# Patient Record
Sex: MALE | Race: ASIAN | URBAN - METROPOLITAN AREA
[De-identification: names, ages, dates, MRNs, and addresses within clinical notes are randomized per-mention and may not be internally consistent; named-entity substitution may affect disease eponyms.]

---

## 2020-06-01 ENCOUNTER — OFFICE VISIT (OUTPATIENT)
Dept: URBAN - METROPOLITAN AREA SURGERY CENTER 8 | Facility: SURGERY CENTER | Age: 40
End: 2020-06-01

## 2020-06-16 ENCOUNTER — OFFICE VISIT (OUTPATIENT)
Dept: URBAN - METROPOLITAN AREA CLINIC 31 | Facility: CLINIC | Age: 40
End: 2020-06-16

## 2020-07-28 ENCOUNTER — OFFICE VISIT (OUTPATIENT)
Dept: URBAN - METROPOLITAN AREA CLINIC 35 | Facility: CLINIC | Age: 40
End: 2020-07-28

## 2020-08-03 ENCOUNTER — OFFICE VISIT (OUTPATIENT)
Dept: URBAN - METROPOLITAN AREA SURGERY CENTER 8 | Facility: SURGERY CENTER | Age: 40
End: 2020-08-03

## 2020-08-19 ENCOUNTER — OFFICE VISIT (OUTPATIENT)
Dept: URBAN - METROPOLITAN AREA CLINIC 35 | Facility: CLINIC | Age: 40
End: 2020-08-19

## 2020-10-13 ENCOUNTER — OFFICE VISIT (OUTPATIENT)
Dept: URBAN - METROPOLITAN AREA CLINIC 31 | Facility: CLINIC | Age: 40
End: 2020-10-13

## 2020-10-13 ENCOUNTER — LAB OUTSIDE AN ENCOUNTER (OUTPATIENT)
Dept: URBAN - METROPOLITAN AREA SURGERY CENTER 8 | Facility: SURGERY CENTER | Age: 40
End: 2020-10-13

## 2020-10-13 VITALS — BODY MASS INDEX: 23.49 KG/M2 | WEIGHT: 155 LBS | HEIGHT: 68 IN

## 2020-10-13 RX ORDER — CHOLECALCIFEROL (VITAMIN D3) 50 MCG
1 TABLET TABLET ORAL ONCE A DAY
Qty: 30 | Status: ACTIVE | COMMUNITY

## 2020-10-13 RX ORDER — SODIUM PICOSULFATE, MAGNESIUM OXIDE, AND ANHYDROUS CITRIC ACID 10; 3.5; 12 MG/160ML; G/160ML; G/160ML
AS DIRECTED FOR SPLIT DOSE LIQUID ORAL
Qty: 1 BOX | Refills: 0 | Status: DISCONTINUED | COMMUNITY
Start: 2020-03-03

## 2020-10-13 RX ORDER — SODIUM PICOSULFATE, MAGNESIUM OXIDE, AND ANHYDROUS CITRIC ACID 10; 3.5; 12 MG/160ML; G/160ML; G/160ML
AS DIRECTED FOR SPLIT DOSE LIQUID ORAL
Qty: 1 BOX | Refills: 0 | OUTPATIENT
Start: 2020-10-13

## 2020-10-13 NOTE — HPI-MIGRATED HPI
;     Abdominal Pain : Patient presents today to discuss getting a colonoscopy. Patient states that the abdominal pain that he was having has greatly improved, "90 % improvement". Patient states that he has been doing yoga and some "home remedies" (homeopathy). Patient has made diet changes and this has also helped. He is currently having 2 bowel movements per day with normal and formed stools. Patient denies blood or mucous in his stool. He denies melena. Patient states that the main reason for the office visit today, is to discuss if it is okay to hold off doing the colonoscopy, as his symptoms have almost resolved. He has no other concerns or complaints today.  He reports history of worms in stool.;

## 2020-11-04 LAB
ALBUMIN/GLOBULIN RATIO: 1.6
ALBUMIN: 4.6
ALKALINE PHOSPHATASE: 102
ALKALINE PHOSPHATASE: 102
ALT: 67
AST: 38
BILIRUBIN, DIRECT: 0.1
BILIRUBIN, INDIRECT: 0.5
BILIRUBIN, TOTAL: 0.6
BONE ISOENZYMES: 75
GLOBULIN: 2.9
INTESTINAL ISOENZYMES: 0
LIVER ISOENZYMES: 25
PROTEIN, TOTAL: 7.5

## 2020-12-02 ENCOUNTER — OFFICE VISIT (OUTPATIENT)
Dept: URBAN - METROPOLITAN AREA SURGERY CENTER 8 | Facility: SURGERY CENTER | Age: 40
End: 2020-12-02

## 2020-12-03 ENCOUNTER — TELEPHONE ENCOUNTER (OUTPATIENT)
Dept: URBAN - METROPOLITAN AREA CLINIC 35 | Facility: CLINIC | Age: 40
End: 2020-12-03

## 2020-12-15 ENCOUNTER — OFFICE VISIT (OUTPATIENT)
Dept: URBAN - METROPOLITAN AREA CLINIC 31 | Facility: CLINIC | Age: 40
End: 2020-12-15

## 2020-12-15 VITALS — BODY MASS INDEX: 23.49 KG/M2 | WEIGHT: 155 LBS | HEIGHT: 68 IN

## 2020-12-15 RX ORDER — CHOLECALCIFEROL (VITAMIN D3) 50 MCG
1 TABLET TABLET ORAL ONCE A DAY
Qty: 30 | Status: ACTIVE | COMMUNITY

## 2020-12-15 RX ORDER — SODIUM PICOSULFATE, MAGNESIUM OXIDE, AND ANHYDROUS CITRIC ACID 10; 3.5; 12 MG/160ML; G/160ML; G/160ML
AS DIRECTED FOR SPLIT DOSE LIQUID ORAL
Qty: 1 BOX | Refills: 0 | Status: DISCONTINUED | COMMUNITY
Start: 2020-10-13

## 2020-12-15 NOTE — HPI-MIGRATED HPI
;     Colonoscopy Follow-Up : Patient presents today for a follow-up after the colonoscopy that was done on 12/02/2020. Since the procedure patient states that he is having 1-2 bowel movements per day with normal and formed stools. Patient denies seeing any blood or mucous in his stool. Patient denies melena. Patient states that he had no complications following his colonoscopy. LLQ discomfort has improved.  Worse with eating heavy.  Of note, patient has added 1 tablespoonful of Benefiber, to his daily medication regimen. Of note, patient completed labs on 11/04/2020 and the results are in EMR.;

## 2021-03-16 ENCOUNTER — TELEPHONE ENCOUNTER (OUTPATIENT)
Dept: URBAN - METROPOLITAN AREA CLINIC 35 | Facility: CLINIC | Age: 41
End: 2021-03-16

## 2021-03-19 ENCOUNTER — OFFICE VISIT (OUTPATIENT)
Dept: URBAN - METROPOLITAN AREA CLINIC 31 | Facility: CLINIC | Age: 41
End: 2021-03-19

## 2021-04-16 ENCOUNTER — OFFICE VISIT (OUTPATIENT)
Dept: URBAN - METROPOLITAN AREA CLINIC 31 | Facility: CLINIC | Age: 41
End: 2021-04-16

## 2021-05-05 ENCOUNTER — OFFICE VISIT (OUTPATIENT)
Dept: URBAN - METROPOLITAN AREA CLINIC 31 | Facility: CLINIC | Age: 41
End: 2021-05-05

## 2021-05-05 LAB
ALBUMIN/GLOBULIN RATIO: 1.5
ALBUMIN: 4.3
ALKALINE PHOSPHATASE: 107
ALKALINE PHOSPHATASE: 107
ALT: 27
AST: 21
BILIRUBIN, DIRECT: 0.1
BILIRUBIN, INDIRECT: 0.4
BILIRUBIN, TOTAL: 0.5
BONE ISOENZYMES: 71
GLOBULIN: 2.9
HEPATITIS A AB, TOTAL: REACTIVE
HEPATITIS A IGM: (no result)
HEPATITIS B CORE AB TOTAL: (no result)
HEPATITIS B SURFACE: (no result)
HEPATITIS B SURFACE: <5
HEPATITIS C ANTIBODY: (no result)
INTESTINAL ISOENZYMES: 0
LIVER ISOENZYMES: 29
PROTEIN, TOTAL: 7.2
SIGNAL TO CUT-OFF: 0.01

## 2021-05-05 RX ORDER — CHOLECALCIFEROL (VITAMIN D3) 50 MCG
1 TABLET TABLET ORAL ONCE A DAY
Qty: 30 | Status: ACTIVE | COMMUNITY

## 2021-05-05 NOTE — HPI-MIGRATED HPI
;     Abdominal Pain : Patient presents today to discuss getting a colonoscopy. Patient states that the abdominal pain that he was having has greatly improved, "90 % improvement". Patient states that he has been doing yoga and some "home remedies" (homeopathy). Patient has made diet changes and this has also helped. He is currently having 2 bowel movements per day with normal and formed stools. Patient denies blood or mucous in his stool. He denies melena. Patient states that the main reason for the office visit today, is to discuss if it is okay to hold off doing the colonoscopy, as his symptoms have almost resolved. He has no other concerns or complaints today.  He reports history of worms in stool.  Left lower quadrant abdominal pain        LAB: HEPATIC FUNCTION PANEL      LAB: HEPATITIS B SURFACE ANTIBODY (QUANT)      LAB: HEPATITIS B CORE AB TOTAL W/REFL IGM      LAB: HEPATITIS B SURFACE ANTIGEN W/REFL CONF      LAB: HEPATITIS C AB W/REFL TO HCV RNA, QN, PCR      LAB: HEPATITIS A AB, TOTAL W/REFL IGM Notes: Plan/Recommendations: 1) Continue Benefiber 1 tablespoonful daily. 2) Push water intake. 3) Review outside imaging results as they are received. 4) Follow liver chemistries. Further liver workup if elevations persist. 5) Repeat Colonoscopy merited in 6 to 10 years for CRC screening. 6) Results of colonoscopy d/w patient including his polyp pathology and diverticular disease. 7) Results of labs were d/w patient. 8) Repeat liver chemistries in 3 months along with hepatitis serologies. 9) Limit alcohol intake.   ;

## 2021-05-12 ENCOUNTER — TELEPHONE ENCOUNTER (OUTPATIENT)
Dept: URBAN - METROPOLITAN AREA CLINIC 35 | Facility: CLINIC | Age: 41
End: 2021-05-12

## 2021-05-12 ENCOUNTER — OFFICE VISIT (OUTPATIENT)
Dept: URBAN - METROPOLITAN AREA CLINIC 31 | Facility: CLINIC | Age: 41
End: 2021-05-12

## 2021-05-12 RX ORDER — CHOLECALCIFEROL (VITAMIN D3) 50 MCG
1 TABLET TABLET ORAL ONCE A DAY
Qty: 30 | Status: ACTIVE | COMMUNITY

## 2021-05-12 NOTE — HPI-MIGRATED HPI
;     Change in Bowel habits : Patient presents today for a follow-up office visit from 12/15/2020. he completed the labs on 04/30/2021 and the results are in EMR.    LAB: HEPATIC FUNCTION PANEL      LAB: HEPATITIS B SURFACE ANTIBODY (QUANT)      LAB: HEPATITIS B CORE AB TOTAL W/REFL IGM      LAB: HEPATITIS B SURFACE ANTIGEN W/REFL CONF      LAB: HEPATITIS C AB W/REFL TO HCV RNA, QN, PCR      LAB: HEPATITIS A AB, TOTAL W/REFL IGM Notes: Plan/Recommendations: 1) Continue Benefiber 1 tablespoonful daily. 2) Push water intake. 3) Review outside imaging results as they are received. 4) Follow liver chemistries. Further liver workup if elevations persist. 5) Repeat Colonoscopy merited in 6 to 10 years for CRC screening. 6) Results of colonoscopy d/w patient including his polyp pathology and diverticular disease. 7) Results of labs were d/w patient. 8) Repeat liver chemistries in 3 months along with hepatitis serologies. 9) Limit alcohol intake.   ;

## 2021-05-12 NOTE — HPI-MIGRATED HPI
;     Follow-up : Patient presents today for a follow-up for lab results. Labs were completed on 04/30/2021 and the results are in EMR. Patient discontinued Benefiber, 1 tablespoonful, daily. He tried the Benefiber for a few days then stopped. he states that he stopped because his bowel habits returned to normal. Patient is currently having 1-2 bowel movements per day with "mostly" normal and formed stools. he denies any blood or mucous in his stool. Patient denies melena. The abdominal pain has mostly resolved. The only time that patient has any lower abdominal discomfort is when he eats food that is very "heavy" or very spicy.  Recent history of chest pain.  He had negative cardiology evaluation.  Currently, he is without chest pain or heartburn.;

## 2021-11-19 ENCOUNTER — OFFICE VISIT (OUTPATIENT)
Dept: URBAN - METROPOLITAN AREA CLINIC 31 | Facility: CLINIC | Age: 41
End: 2021-11-19

## 2021-11-19 ENCOUNTER — TELEPHONE ENCOUNTER (OUTPATIENT)
Dept: URBAN - METROPOLITAN AREA CLINIC 35 | Facility: CLINIC | Age: 41
End: 2021-11-19

## 2021-11-19 VITALS — BODY MASS INDEX: 23.49 KG/M2 | HEIGHT: 68 IN | WEIGHT: 155 LBS

## 2021-11-19 RX ORDER — CHOLECALCIFEROL (VITAMIN D3) 50 MCG
1 TABLET TABLET ORAL ONCE A DAY
Qty: 30 | Status: ACTIVE | COMMUNITY

## 2021-11-19 NOTE — HPI-MIGRATED HPI
;     Change in Bowel habits : Patient presents today via televisit with consent for a follow-up office visit from 05/12/2021. He completed the labs on 04/30/2021 and the results are in EMR. He admits increasing his water intake since last visit. he states he is drinking 5-6 glasses of water daily. He isn't currently taking benefiber daily. He is not currently taking zegerid OTC. Currently has 2 BM a day, stools are typical normal but at times stools can be loose without form without blood, mucus or melena. He has occasional episodes of bloating/gas. He admits he continues to a LLQ pain after he eats certain foods. He states spicy or heavy foods and alcoholic beverages will cause his pain to intensify. He states he has tried to limit his alcohol intact, he is currently drinking weekly about 4-6 drinks a week.      ;

## 2021-11-30 ENCOUNTER — TELEPHONE ENCOUNTER (OUTPATIENT)
Dept: URBAN - METROPOLITAN AREA CLINIC 35 | Facility: CLINIC | Age: 41
End: 2021-11-30

## 2021-11-30 ENCOUNTER — OFFICE VISIT (OUTPATIENT)
Dept: URBAN - METROPOLITAN AREA CLINIC 31 | Facility: CLINIC | Age: 41
End: 2021-11-30

## 2021-11-30 VITALS
BODY MASS INDEX: 24.55 KG/M2 | OXYGEN SATURATION: 98 % | HEIGHT: 68 IN | SYSTOLIC BLOOD PRESSURE: 124 MMHG | WEIGHT: 162 LBS | DIASTOLIC BLOOD PRESSURE: 82 MMHG | HEART RATE: 86 BPM

## 2021-11-30 PROBLEM — 724538004: Status: ACTIVE | Noted: 2020-12-15

## 2021-11-30 RX ORDER — CHOLECALCIFEROL (VITAMIN D3) 50 MCG
1 TABLET TABLET ORAL ONCE A DAY
Qty: 30 | Status: ACTIVE | COMMUNITY

## 2021-11-30 NOTE — HPI-MIGRATED HPI
;     Follow-up : Patient presents today for a follow-up office visit from 05/12/2021. He did not complete labs ordered at his last office visit. Patient requests that the order is resent to [facility]. Patient has not had to use the Benefiber. He is currently having 2 bowel movements per day with normal and formed stools. He denies any blood or mucous in his stool. Patient denies melena. The abdominal pain continues to be intermittent.  Patient states that is he eats too much or drinks alcohol, he will have lower abdominal pain. This also happens if he eats spicy foods. Patient states that he used to do intermittent fasting. He had to stop this 3 weeks ago because he would get nauseous if his stomach was completely empty. After he stopped intermittent fasting, the nausea went away. Patient would like to discuss this. Denies melena, emesis, BRBPR or epigastric abdominal pain.  Denies aspirin or NSAID use.;

## 2021-11-30 NOTE — EXAM-MIGRATED EXAMINATIONS
GENERAL APPEARANCE: - alert, in no acute distress, well developed, well nourished;   ORAL CAVITY: - mucosa moist.  MP4;   THROAT: - clear;   HEART: - no murmurs, regular rate and rhythm, S1, S2 normal;   LUNGS: - clear to auscultation bilaterally, good air movement, no wheezes, rales, rhonchi;   ABDOMEN: - bowel sounds present, no masses palpable, no organomegaly , no rebound tenderness, soft, nontender, nondistended;   EXTREMITIES: - no clubbing, cyanosis, or edema;